# Patient Record
Sex: FEMALE | Race: WHITE | URBAN - METROPOLITAN AREA
[De-identification: names, ages, dates, MRNs, and addresses within clinical notes are randomized per-mention and may not be internally consistent; named-entity substitution may affect disease eponyms.]

---

## 2018-11-01 ENCOUNTER — EMERGENCY (EMERGENCY)
Facility: HOSPITAL | Age: 33
LOS: 1 days | Discharge: HOME | End: 2018-11-01
Admitting: EMERGENCY MEDICINE
Payer: COMMERCIAL

## 2018-11-01 VITALS
DIASTOLIC BLOOD PRESSURE: 92 MMHG | OXYGEN SATURATION: 100 % | SYSTOLIC BLOOD PRESSURE: 152 MMHG | HEART RATE: 89 BPM | RESPIRATION RATE: 18 BRPM | TEMPERATURE: 98 F

## 2018-11-01 PROCEDURE — 99284 EMERGENCY DEPT VISIT MOD MDM: CPT

## 2018-11-01 PROCEDURE — 70450 CT HEAD/BRAIN W/O DYE: CPT | Mod: 26

## 2018-11-01 RX ORDER — ACETAMINOPHEN 500 MG
650 TABLET ORAL ONCE
Qty: 0 | Refills: 0 | Status: COMPLETED | OUTPATIENT
Start: 2018-11-01 | End: 2018-11-01

## 2018-11-01 RX ADMIN — Medication 650 MILLIGRAM(S): at 22:57

## 2018-11-01 NOTE — ED PROVIDER NOTE - OBJECTIVE STATEMENT
34 y/o F presents to ED c/o headache and eye pupil change s/p hitting her head with dumbbells 5 days ago.  Pt states she was pressing the weights above her head and accidentally hit her head while lowering them.  She did not drop them on her head.  She felt slightly dizzy after the injury but continued to work out.  Today, while removing her make up, she noted her L pupil is a little larger than the right.  She has had  intermittent headaches since the injury.  She denies visual changes, weakness, numbness, neck or back pain, n/v/d, light sensitivity.  + sound sensitivity.  Pt does not use eye drops or glasses.

## 2018-11-01 NOTE — ED PROVIDER NOTE - MEDICAL DECISION MAKING DETAILS
34 y/o F presents to ED c/o headache and pupil change.  Pt struck her head with dumbbells 5 days ago.  Pt well appearing. VSS. NAD.  Neuros intact.  CT head.

## 2018-11-01 NOTE — ED ADULT NURSE NOTE - OBJECTIVE STATEMENT
Pt is a 33y female complaining of head trauma. Pt states that she was lifting weights at the gym on Saturday when she accidently hit her head with a 15lb weight. Pt states that since then she has been experiencing headache, dizziness, nausea, difficulty focusing, and difficulty falling asleep. Pt states that she noticed today that her pupils are slightly different sizes. PERRLA. Pt denies loc and blood thinners. Pt denies chest pain, numbness, tingling, neckpain, blurred vision, fever and chills. Will continue to monitor. Pt is a 33y female complaining of head trauma. Pt states that she was lifting weights at the gym on Saturday when she accidently hit her head with a 15lb weight. Pt states that since then she has been experiencing headache, dizziness, nausea, difficulty focusing, and difficulty falling asleep. Pt states that she noticed today that her pupils are slightly different sizes. Left pupil slightly larger than right. Pupils reactive to light bilaterally . Pt denies loc and blood thinners. Pt denies chest pain, numbness, tingling, neckpain, blurred vision, fever and chills. Will continue to monitor.

## 2018-11-01 NOTE — ED ADULT TRIAGE NOTE - CHIEF COMPLAINT QUOTE
Pt. reports dropping a 15lb weight on her head while working out on saturday, since pt. has been experiencing headaches, nausea, and difficulty sleeping. Today pt. noticed her left pupil was more dilated then the right. Pt. denies any LOC or change in her vision

## 2018-11-01 NOTE — ED PROVIDER NOTE - CARE PLAN
Principal Discharge DX:	Closed head injury  Secondary Diagnosis:	Eye abnormalities Principal Discharge DX:	Concussion  Secondary Diagnosis:	Eye abnormalities

## 2018-11-02 RX ORDER — SODIUM CHLORIDE 9 MG/ML
100 INJECTION INTRAMUSCULAR; INTRAVENOUS; SUBCUTANEOUS ONCE
Qty: 0 | Refills: 0 | Status: COMPLETED | OUTPATIENT
Start: 2018-11-02 | End: 2018-11-02

## 2018-11-02 RX ORDER — SODIUM CHLORIDE 9 MG/ML
3 INJECTION INTRAMUSCULAR; INTRAVENOUS; SUBCUTANEOUS ONCE
Qty: 0 | Refills: 0 | Status: COMPLETED | OUTPATIENT
Start: 2018-11-02 | End: 2018-11-02

## 2018-11-02 RX ORDER — METOCLOPRAMIDE HCL 10 MG
10 TABLET ORAL ONCE
Qty: 0 | Refills: 0 | Status: COMPLETED | OUTPATIENT
Start: 2018-11-02 | End: 2018-11-02

## 2018-11-02 RX ADMIN — SODIUM CHLORIDE 3 MILLILITER(S): 9 INJECTION INTRAMUSCULAR; INTRAVENOUS; SUBCUTANEOUS at 00:54

## 2018-11-02 RX ADMIN — Medication 10 MILLIGRAM(S): at 00:52

## 2018-11-02 RX ADMIN — SODIUM CHLORIDE 100 MILLILITER(S): 9 INJECTION INTRAMUSCULAR; INTRAVENOUS; SUBCUTANEOUS at 00:52

## 2018-11-05 DIAGNOSIS — S09.90XA UNSPECIFIED INJURY OF HEAD, INITIAL ENCOUNTER: ICD-10-CM
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
## 2018-11-05 DIAGNOSIS — H57.89 OTHER SPECIFIED DISORDERS OF EYE AND ADNEXA: ICD-10-CM
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
## 2018-11-05 DIAGNOSIS — S06.0X0A CONCUSSION WITHOUT LOSS OF CONSCIOUSNESS, INITIAL ENCOUNTER: ICD-10-CM
